# Patient Record
Sex: MALE | Race: WHITE | NOT HISPANIC OR LATINO | Employment: UNEMPLOYED | URBAN - METROPOLITAN AREA
[De-identification: names, ages, dates, MRNs, and addresses within clinical notes are randomized per-mention and may not be internally consistent; named-entity substitution may affect disease eponyms.]

---

## 2017-06-20 ENCOUNTER — APPOINTMENT (OUTPATIENT)
Dept: URGENT CARE | Facility: MEDICAL CENTER | Age: 27
End: 2017-06-20

## 2017-06-20 ENCOUNTER — APPOINTMENT (OUTPATIENT)
Dept: LAB | Facility: MEDICAL CENTER | Age: 27
End: 2017-06-20

## 2017-06-20 ENCOUNTER — TRANSCRIBE ORDERS (OUTPATIENT)
Dept: URGENT CARE | Facility: MEDICAL CENTER | Age: 27
End: 2017-06-20

## 2017-06-20 DIAGNOSIS — Z00.8 SPECIAL EXAM: Primary | ICD-10-CM

## 2017-06-20 DIAGNOSIS — Z00.8 SPECIAL EXAM: ICD-10-CM

## 2017-06-20 PROCEDURE — 36415 COLL VENOUS BLD VENIPUNCTURE: CPT

## 2017-06-20 PROCEDURE — 86480 TB TEST CELL IMMUN MEASURE: CPT

## 2017-06-22 LAB
ANNOTATION COMMENT IMP: NORMAL
GAMMA INTERFERON BACKGROUND BLD IA-ACNC: 0.02 IU/ML
M TB IFN-G BLD-IMP: NEGATIVE
M TB IFN-G CD4+ BCKGRND COR BLD-ACNC: 0.01 IU/ML
M TB IFN-G CD4+ T-CELLS BLD-ACNC: 0.03 IU/ML
MITOGEN IGNF BLD-ACNC: 9.18 IU/ML
QUANTIFERON-TB GOLD IN TUBE: NORMAL
SERVICE CMNT-IMP: NORMAL

## 2017-07-02 ENCOUNTER — GENERIC CONVERSION - ENCOUNTER (OUTPATIENT)
Dept: OTHER | Facility: OTHER | Age: 27
End: 2017-07-02

## 2018-01-12 NOTE — RESULT NOTES
Verified Results  (1) QUANTIFERON - TB GOLD 20Jun2017 09:53AM Zaira Michael     Test Name Result Flag Reference   QUANTIFERON TB GOLD      Specimen incubated at [place], [place]    Performed at:  [facility] [address]  [id number]  : Alexey Fraser MD, Phone:  [phone]   QUANTIFERON TB GOLD Negative  Negative   QUANTIFERON CRITERIA Comment     To be considered positive a specimen should have a TB Ag minus Nil  value greater than or equal to 0 35 IU/mL and in addition the TB Ag  minus Nil value must be greater than or equal to 25% of the Nil  value  There may be insufficient information in these values to  differentiate between some negative and some indeterminate test  values  QUANTIFERON TB AG VALUE 0 03 IU/mL     QUANTIFERON NIL VALUE 0 02 IU/mL     QUANTIFERON MITOGEN VALUE 9 18 IU/mL     QFT TB AG MINUS NIL VALUE 0 01 IU/mL     QFT INTERPRETATION Comment     The QuantiFERON TB Gold (in Tube) assay is intended for use as an aid  in the diagnosis of TB infection  Negative results suggest that there  is no TB infection  In patients with high suspicion of exposure, a  negative test should be repeated  A positive test indicates infection  with Mycobacterium tuberculosis  Among individuals without  tuberculosis infection, a positive test may be due to exposure to  [place], M  marcoi or M  marinum  On the Internet, go to  [facility] gov/tb for further details    Performed at:  [address] [place] [address]  [id number]  : Alexey Fraser MD, Phone:  [phone]

## 2018-08-15 ENCOUNTER — TRANSCRIBE ORDERS (OUTPATIENT)
Dept: LAB | Facility: HOSPITAL | Age: 28
End: 2018-08-15

## 2018-08-15 ENCOUNTER — APPOINTMENT (OUTPATIENT)
Dept: LAB | Facility: HOSPITAL | Age: 28
End: 2018-08-15

## 2018-08-15 DIAGNOSIS — Z00.8 ENCOUNTER FOR OTHER GENERAL EXAMINATION: ICD-10-CM

## 2018-08-15 DIAGNOSIS — Z00.8 ENCOUNTER FOR OTHER GENERAL EXAMINATION: Primary | ICD-10-CM

## 2018-08-15 LAB
CHOLEST SERPL-MCNC: 167 MG/DL (ref 50–200)
EST. AVERAGE GLUCOSE BLD GHB EST-MCNC: 82 MG/DL
HBA1C MFR BLD: 4.5 % (ref 4.2–6.3)
HDLC SERPL-MCNC: 55 MG/DL (ref 40–60)
LDLC SERPL CALC-MCNC: 96 MG/DL (ref 0–100)
NONHDLC SERPL-MCNC: 112 MG/DL
TRIGL SERPL-MCNC: 82 MG/DL

## 2018-08-15 PROCEDURE — 80061 LIPID PANEL: CPT

## 2018-08-15 PROCEDURE — 36415 COLL VENOUS BLD VENIPUNCTURE: CPT

## 2018-08-15 PROCEDURE — 83036 HEMOGLOBIN GLYCOSYLATED A1C: CPT

## 2018-10-16 ENCOUNTER — HOSPITAL ENCOUNTER (EMERGENCY)
Facility: HOSPITAL | Age: 28
Discharge: HOME | End: 2018-10-16
Attending: EMERGENCY MEDICINE | Admitting: EMERGENCY MEDICINE
Payer: OTHER MISCELLANEOUS

## 2018-10-16 VITALS
HEIGHT: 72 IN | OXYGEN SATURATION: 99 % | BODY MASS INDEX: 26.41 KG/M2 | SYSTOLIC BLOOD PRESSURE: 150 MMHG | RESPIRATION RATE: 16 BRPM | HEART RATE: 66 BPM | DIASTOLIC BLOOD PRESSURE: 78 MMHG | WEIGHT: 195 LBS | TEMPERATURE: 98.4 F

## 2018-10-16 DIAGNOSIS — Z77.21 EXPOSURE TO BLOOD OR BODY FLUID: Primary | ICD-10-CM

## 2018-10-16 LAB
ALT SERPL W P-5'-P-CCNC: 30 U/L (ref 12–78)
HBV SURFACE AB SER-ACNC: 806.68 MIU/ML
HBV SURFACE AG SER QL: NORMAL
HCV AB SER QL: NORMAL

## 2018-10-16 PROCEDURE — 86803 HEPATITIS C AB TEST: CPT | Performed by: EMERGENCY MEDICINE

## 2018-10-16 PROCEDURE — 86706 HEP B SURFACE ANTIBODY: CPT | Performed by: EMERGENCY MEDICINE

## 2018-10-16 PROCEDURE — 84460 ALANINE AMINO (ALT) (SGPT): CPT | Performed by: EMERGENCY MEDICINE

## 2018-10-16 PROCEDURE — 87340 HEPATITIS B SURFACE AG IA: CPT | Performed by: EMERGENCY MEDICINE

## 2018-10-16 PROCEDURE — 99283 EMERGENCY DEPT VISIT LOW MDM: CPT

## 2018-10-16 PROCEDURE — 36415 COLL VENOUS BLD VENIPUNCTURE: CPT | Performed by: EMERGENCY MEDICINE

## 2018-10-16 PROCEDURE — 87389 HIV-1 AG W/HIV-1&-2 AB AG IA: CPT | Performed by: EMERGENCY MEDICINE

## 2018-10-16 NOTE — ED PROVIDER NOTES
History  Chief Complaint   Patient presents with    Body Fluid Exposure     pt splashed in right eye with blood from IV catheter during work  26-year-old male presents for evaluation of blood splashed in her right eye while removing IV catheter from patient  Patient reports flushing eye for 15 straight minutes immediately after exposure  No changes in vision  No pain with extraocular movements  Source patient is known IV drug abuser but reported that she gets tested for HIV every 6 months and has been negative  No blood work has been sent for source patient as she was discharged  None       History reviewed  No pertinent past medical history  History reviewed  No pertinent surgical history  History reviewed  No pertinent family history  I have reviewed and agree with the history as documented  Social History   Substance Use Topics    Smoking status: Never Smoker    Smokeless tobacco: Never Used    Alcohol use Yes        Review of Systems   Constitutional: Negative for chills, diaphoresis and fever  HENT: Negative for congestion and rhinorrhea  Eyes: Negative for photophobia, pain, discharge, redness, itching and visual disturbance  Respiratory: Negative for cough, shortness of breath and wheezing  Cardiovascular: Negative for chest pain and leg swelling  Gastrointestinal: Negative for abdominal pain, diarrhea, nausea and vomiting  Genitourinary: Negative for difficulty urinating, dysuria, frequency and urgency  Musculoskeletal: Negative for back pain and neck pain  Skin: Negative for color change and rash  Neurological: Negative for syncope, numbness and headaches  All other systems reviewed and are negative        Physical Exam  ED Triage Vitals [10/16/18 0113]   Temperature Pulse Respirations Blood Pressure SpO2   98 4 °F (36 9 °C) 66 16 150/78 99 %      Temp Source Heart Rate Source Patient Position - Orthostatic VS BP Location FiO2 (%)   Tympanic Monitor Sitting Left arm --      Pain Score       No Pain           Orthostatic Vital Signs  Vitals:    10/16/18 0113   BP: 150/78   Pulse: 66   Patient Position - Orthostatic VS: Sitting       Physical Exam   Constitutional: He is oriented to person, place, and time  He appears well-developed and well-nourished  No distress  HENT:   Head: Normocephalic and atraumatic  Eyes: Pupils are equal, round, and reactive to light  Conjunctivae and EOM are normal    No pain with extraocular movements  No surrounding erythema  Neck: Normal range of motion  Neck supple  Cardiovascular: Normal rate and regular rhythm  Pulmonary/Chest: Effort normal and breath sounds normal  No respiratory distress  He has no wheezes  He has no rales  Abdominal: Soft  Bowel sounds are normal  There is no tenderness  There is no guarding  Musculoskeletal: Normal range of motion  He exhibits no edema or tenderness  Neurological: He is alert and oriented to person, place, and time  No cranial nerve deficit  Skin: Skin is warm  He is not diaphoretic  No erythema  Psychiatric: He has a normal mood and affect  His behavior is normal    Nursing note and vitals reviewed  ED Medications  Medications - No data to display    Diagnostic Studies  Results Reviewed     Procedure Component Value Units Date/Time    ALT [[id number]]  (Normal) Collected:  10/16/18 0122    Lab Status:  Final result Specimen:  Blood from Arm, Right Updated:  10/16/18 0148     ALT 30 U/L     Hepatitis B surface antigen [[id number]] Collected:  10/16/18 0122    Lab Status: In process Specimen:  Blood from Arm, Right Updated:  10/16/18 0125    Hepatitis C antibody [[id number]] Collected:  10/16/18 0122    Lab Status: In process Specimen:  Blood from Arm, Right Updated:  10/16/18 0125    Hepatitis B surface antibody [[id number]] Collected:  10/16/18 0122    Lab Status:   In process Specimen:  Blood from Arm, Right Updated:  10/16/18 0125    HIV 1/2 AG-AB combo [[id number]] Collected:  10/16/18 0122    Lab Status: In process Specimen:  Blood from Arm, Right Updated:  10/16/18 0125                 No orders to display         Procedures  Procedures      Phone Consults  ED Phone Contact    ED Course                               MDM  Number of Diagnoses or Management Options  Exposure to blood or body fluid:   Diagnosis management comments: 13-year-old male presenting with blood exposure to right eye  Continuous irrigation completed  Will send exposure panel  Follow up with occupational medicine in 24-48 hours  Post exposure prophylaxis not indicated at this time  CritCare Time    Disposition  Final diagnoses:   Exposure to blood or body fluid     Time reflects when diagnosis was documented in both MDM as applicable and the Disposition within this note     Time User Action Codes Description Comment    10/16/2018  1:26 AM Shayna Amel Add [Z77 21] Exposure to blood or body fluid       ED Disposition     ED Disposition Condition Comment    Discharge  Trent Barnett discharge to home/self care  Condition at discharge: Stable        Follow-up Information     Follow up With Specialties Details Why Paul1 Mitch Mayer In 1 day For results [address]  [address] [facility] [address] [phone]            There are no discharge medications for this patient  No discharge procedures on file  ED Provider  Attending physically available and evaluated Trent Barnett I managed the patient along with the ED Attending      Electronically Signed by         Mallory Daniel DO  10/16/18 9199

## 2018-10-16 NOTE — DISCHARGE INSTRUCTIONS
Body Substance Exposure   WHAT YOU NEED TO KNOW:   Body substance exposure is when you come in contact with another person's blood or body fluid that contains blood  Semen or vaginal fluid can also spread infection  Contact may place you at risk for hepatitis B virus (HBV), human immunodeficiency virus (HIV), or hepatitis C virus (HCV)  DISCHARGE INSTRUCTIONS:   Ways that body substance exposure can occur:   · A needle stick or a cut from a sharp object    · Contact with an open wound, such as a cut, chapped skin, or an abrasion     · Contact with the eyes or mucus membrane, such as the lining of the mouth or nose    · Human bite  What to do when exposed to a body substance:   · Clean the area immediately  Wash an open wound with soap and clean water  Flush your eyes with saline solution or water  Rinse mucus membranes with water or saline solution  · Contact your healthcare provider as soon as possible  He will ask how the exposure happened and where the blood or body fluid touched your body  If possible, tell your healthcare provider about the person's health status and history, such as vaccinations  Treatment works best if started as soon as possible  Treatment that may be given for body substance exposure:  Postexposure prophylaxis (PEP) is medical treatment that may protect a person from infection after exposure to another person's body fluids  PEP may be needed if the person whose fluids you were exposed to has a known infection  Do not donate blood, organs, tissues, or semen until your follow-up is completed at 6 months  · PEP for HBV  may include HBV vaccinations or medicine to prevent HVB  This treatment works best if started within 24 hours of exposure  · PEP for HIV  may include 2 or 3 types of medicine to prevent HIV  This treatment works best if started within 72 hours of exposure  Continue treatment for 4 weeks   Practice safe sex to prevent spreading HIV and to prevent pregnancy during the follow-up period  If you are breastfeeding, your healthcare provider may recommend that you stop  Ask your healthcare provider if you can breastfeed  · PEP for HCV  is not  available  You will need to be tested for HCV and treated if you were infected  Follow up with your healthcare provider as directed: You will need more blood tests  PEP for HIV often causes side effects  Talk with your healthcare provider about your symptoms  He will need to make sure you are taking the medicine correctly  Write down your questions so you remember to ask them during your visits  Prevent body substance exposure: If you care for another person who has HBV, HIV, or HCV, protect yourself and others from infection:  · Wash your hands thoroughly  before and after you provide medical care  · Use protective equipment  Gloves or a face mask may protect your hands, nose, and mouth from splashes of blood or body fluid  · Do not recap needles after use  Recapping needles increases your risk of a needle stick  · Throw away needles in a safe container  A hard container with a lid may prevent accidental needle sticks  Contact your healthcare provider if:   · You have a fever  · You have a rash  · You have weakness or muscle pain  · You have abdominal pain, nausea, or vomiting  · You have diarrhea  · You have a headache  · You have questions or concerns about your condition or care  © 2017 [employer]  Information is for End User's use only and may not be sold, redistributed or otherwise used for commercial purposes  All illustrations and images included in CareNotes® are the copyrighted property of [employer] M [employer]  or Dereck Cornelius  The above information is an  only  It is not intended as medical advice for individual conditions or treatments  Talk to your doctor, nurse or pharmacist before following any medical regimen to see if it is safe and effective for you

## 2018-10-17 LAB — HIV 1+2 AB+HIV1 P24 AG SERPL QL IA: NORMAL

## 2018-10-17 NOTE — ED ATTENDING ATTESTATION
Caryn Fregoso DO, saw and evaluated the patient  I have discussed the patient with the resident/non-physician practitioner and agree with the resident's/non-physician practitioner's findings, Plan of Care, and MDM as documented in the resident's/non-physician practitioner's note, except where noted  All available labs and Radiology studies were reviewed  At this point I agree with the current assessment done in the Emergency Department  I have conducted an independent evaluation of this patient a history and physical is as follows:    33 yom with accidental drop of blood to eye when removing an IV at work in the ED  He irrigated his eye well prior to presentation  /78 (BP Location: Left arm)   Pulse 66   Temp 98 4 °F (36 9 °C) (Tympanic)   Resp 16   Ht 6' (1 829 m)   Wt 88 5 kg (195 lb)   SpO2 99%   BMI 26 45 kg/m²   NAD    Exposure protocol followed       Dx  Bodily fluid exposure     Critical Care Time  CritCare Time    Procedures

## 2018-12-07 ENCOUNTER — TRANSCRIBE ORDERS (OUTPATIENT)
Dept: LAB | Facility: HOSPITAL | Age: 28
End: 2018-12-07

## 2018-12-07 ENCOUNTER — APPOINTMENT (OUTPATIENT)
Dept: LAB | Facility: HOSPITAL | Age: 28
End: 2018-12-07

## 2018-12-07 DIAGNOSIS — Z20.828 EXPOSURE TO SARS-ASSOCIATED CORONAVIRUS: Primary | ICD-10-CM

## 2018-12-07 DIAGNOSIS — Z20.828 EXPOSURE TO SARS-ASSOCIATED CORONAVIRUS: ICD-10-CM

## 2018-12-07 LAB — ALT SERPL W P-5'-P-CCNC: 36 U/L (ref 12–78)

## 2018-12-07 PROCEDURE — 36415 COLL VENOUS BLD VENIPUNCTURE: CPT

## 2018-12-07 PROCEDURE — 87389 HIV-1 AG W/HIV-1&-2 AB AG IA: CPT

## 2018-12-07 PROCEDURE — 84460 ALANINE AMINO (ALT) (SGPT): CPT

## 2018-12-10 LAB — HIV 1+2 AB+HIV1 P24 AG SERPL QL IA: NORMAL

## 2019-01-24 ENCOUNTER — APPOINTMENT (OUTPATIENT)
Dept: LAB | Facility: HOSPITAL | Age: 29
End: 2019-01-24

## 2019-01-24 ENCOUNTER — TRANSCRIBE ORDERS (OUTPATIENT)
Dept: LAB | Facility: HOSPITAL | Age: 29
End: 2019-01-24

## 2019-01-24 DIAGNOSIS — Z20.828 EXPOSURE TO SARS-ASSOCIATED CORONAVIRUS: ICD-10-CM

## 2019-01-24 DIAGNOSIS — Z20.828 EXPOSURE TO SARS-ASSOCIATED CORONAVIRUS: Primary | ICD-10-CM

## 2019-01-24 PROCEDURE — 86803 HEPATITIS C AB TEST: CPT

## 2019-01-24 PROCEDURE — 36415 COLL VENOUS BLD VENIPUNCTURE: CPT

## 2019-01-24 PROCEDURE — 87389 HIV-1 AG W/HIV-1&-2 AB AG IA: CPT

## 2019-01-25 LAB
HCV AB SER QL: NORMAL
HIV 1+2 AB+HIV1 P24 AG SERPL QL IA: NORMAL

## 2019-04-23 ENCOUNTER — APPOINTMENT (OUTPATIENT)
Dept: LAB | Facility: HOSPITAL | Age: 29
End: 2019-04-23

## 2019-04-23 ENCOUNTER — TRANSCRIBE ORDERS (OUTPATIENT)
Dept: LAB | Facility: HOSPITAL | Age: 29
End: 2019-04-23

## 2019-04-23 DIAGNOSIS — Z20.828 EXPOSURE TO SARS-ASSOCIATED CORONAVIRUS: ICD-10-CM

## 2019-04-23 DIAGNOSIS — Z20.828 EXPOSURE TO SARS-ASSOCIATED CORONAVIRUS: Primary | ICD-10-CM

## 2019-04-23 PROCEDURE — 87389 HIV-1 AG W/HIV-1&-2 AB AG IA: CPT

## 2019-04-23 PROCEDURE — 36415 COLL VENOUS BLD VENIPUNCTURE: CPT

## 2019-04-23 PROCEDURE — 86803 HEPATITIS C AB TEST: CPT

## 2019-04-24 LAB
HCV AB SER QL: NORMAL
HIV 1+2 AB+HIV1 P24 AG SERPL QL IA: NORMAL

## 2019-08-09 ENCOUNTER — TRANSCRIBE ORDERS (OUTPATIENT)
Dept: ADMINISTRATIVE | Facility: HOSPITAL | Age: 29
End: 2019-08-09

## 2019-08-09 ENCOUNTER — APPOINTMENT (OUTPATIENT)
Dept: LAB | Facility: IMAGING CENTER | Age: 29
End: 2019-08-09
Payer: COMMERCIAL

## 2019-08-09 DIAGNOSIS — Z11.1 SCREENING EXAMINATION FOR PULMONARY TUBERCULOSIS: ICD-10-CM

## 2019-08-09 DIAGNOSIS — Z11.1 SCREENING EXAMINATION FOR PULMONARY TUBERCULOSIS: Primary | ICD-10-CM

## 2019-08-09 PROCEDURE — 36415 COLL VENOUS BLD VENIPUNCTURE: CPT

## 2019-08-09 PROCEDURE — 86480 TB TEST CELL IMMUN MEASURE: CPT

## 2019-08-12 LAB
GAMMA INTERFERON BACKGROUND BLD IA-ACNC: 0.03 IU/ML
M TB IFN-G BLD-IMP: NEGATIVE
M TB IFN-G CD4+ BCKGRND COR BLD-ACNC: -0.01 IU/ML
M TB IFN-G CD4+ BCKGRND COR BLD-ACNC: -0.01 IU/ML
MITOGEN IGNF BCKGRD COR BLD-ACNC: >10 IU/ML